# Patient Record
Sex: MALE | Race: WHITE
[De-identification: names, ages, dates, MRNs, and addresses within clinical notes are randomized per-mention and may not be internally consistent; named-entity substitution may affect disease eponyms.]

---

## 2017-12-04 ENCOUNTER — HOSPITAL ENCOUNTER (OUTPATIENT)
Dept: HOSPITAL 84 - D.ECHO | Age: 72
Discharge: HOME | End: 2017-12-04
Attending: INTERNAL MEDICINE
Payer: MEDICARE

## 2017-12-04 VITALS — BODY MASS INDEX: 23.4 KG/M2

## 2017-12-04 DIAGNOSIS — I48.91: Primary | ICD-10-CM

## 2017-12-12 NOTE — EC
PATIENT:JOSE MARIA HUSTON                DATE OF SERVICE: 12/04/17
SEX: M                                  MEDICAL RECORD: [id number]
DATE OF BIRTH: [birth year]                        [facility]          
AGE OF PATIENT: 72                             ADMISSION DATE: 12/04/17
 
REFERRING PHYSICIAN:                               
 
INTERPRETING PHYSICIAN: RACHEL BENAVIDES MD             
 
 
 
                             ECHOCARDIOGRAM REPORT
  ECHO CHARGES 4               ECHO COMPLETE            
 
 
 
CLINICAL DIAGNOSIS: A-FIB/CMP                     
 
                         ECHOCARDIOGRAPHIC MEASUREMENTS
      (adult normal given)
   AC root (d.<3.7cm) 3.8  cm   LV Septum d (<1.2 cm> 1.2  cm
      Valve Excursion 2.1  cm     LV Septum (systole) 1.9  cm
Left Atria (s.<4.0cm> 2.9  cm          LVPW d(<1.2cm) 1.3  cm
        RV (d.<2.3cm) 2.2  cm           LVPW (sytole) 1.9  cm
  LV diastole(<5.6CM) 5.2  cm       MV E-F(>70mm/sec)      cm
           LV systole 2.8  cm           LVOT Diameter 2.0  cm
       MV exc.(>10mm)      cm
Est.ejection fraction (50-75%)     %   Pericardial Effusion N
 
   DOPPLER:
     LVIT      cm/sec A 55.0 cm/sec E 41.0  cm/sec
       LA      cm/sec      RVSP 45.0 mmHg
     LVOT 101  cm/sec   AOP1/2T      m/s
  Asc. Ao 130  cm/sec
     RVOT 57.0 cm/sec
       RA      cm/sec
         cm/sec
 AV Gradient Peak 6.7  mmHg  AV Mean 2.8  mmHg  AV Area 2.4  cm
 MV Gradient Peak 2.5  mmHg  MV Mean 0.76 mmHg  MV Area      cm
   COMMENTS:                                              
 
 
 Cardiac Sonographer: Nicole STEPHENSOE            
      Cardiologist: Med Chirinos               
             TAPE# PACS           
                                                                                     
 
 
DATE OF SERVICE:  12/04/2017
 
Echocardiogram
 
FINDINGS:
1.  Left ventricular chamber size is within normal limits.  Left ventricular
systolic function is normal.  Overall ejection fraction estimated at 55%.
2.  Left atrium is within normal limits at 2.9 cm.  Right atrium and right
ventricular chamber sizes are mildly dilated.
3.  Valvular structures have normal structure and motion.
 
 
 
ECHOCARDIOGRAM REPORT                          [id number]    JOSE MARIA HUSTON        
 
 
4.  Doppler interrogation reveals mild to moderate aortic insufficiency,
moderate mitral regurgitation, mild to moderate tricuspid regurgitation, no
other valvular insufficiency or stenosis.  Pulmonary systolic pressure is mildly
elevated estimated at 45 mmHg.
5.  No evidence of pericardial effusion or left ventricular thrombus.
 
TRANSINT:[id number] Voice Confirmation ID: [id number] DOCUMENT ID: [id number]
                                           
                                           RACHEL BENAVIDES MD             
 
 
 
Electronically Signed by RACHEL BENAVIDES on 12/12/17 at 1215
 
 
 
 
 
 
 
 
 
 
 
 
 
 
 
 
 
 
 
 
 
 
 
 
 
 
 
 
 
 
 
 
 
CC:                                                             [id number]
DICTATION DATE: 12/07/17 1257     :     12/07/17 1306      DEP CLI 
                                                                      12/04/17
[address] [address]

## 2017-12-27 ENCOUNTER — HOSPITAL ENCOUNTER (OUTPATIENT)
Dept: HOSPITAL 84 - D.CATH | Age: 72
Discharge: HOME | End: 2017-12-27
Attending: INTERNAL MEDICINE
Payer: MEDICARE

## 2017-12-27 VITALS — SYSTOLIC BLOOD PRESSURE: 156 MMHG | DIASTOLIC BLOOD PRESSURE: 96 MMHG

## 2017-12-27 VITALS
BODY MASS INDEX: 23.35 KG/M2 | WEIGHT: 145.3 LBS | HEIGHT: 66 IN | BODY MASS INDEX: 23.35 KG/M2 | WEIGHT: 145.3 LBS | HEIGHT: 66 IN

## 2017-12-27 DIAGNOSIS — I48.91: ICD-10-CM

## 2017-12-27 DIAGNOSIS — R07.9: ICD-10-CM

## 2017-12-27 DIAGNOSIS — I10: ICD-10-CM

## 2017-12-27 DIAGNOSIS — I08.0: ICD-10-CM

## 2017-12-27 DIAGNOSIS — Z01.812: ICD-10-CM

## 2017-12-27 DIAGNOSIS — R94.30: Primary | ICD-10-CM

## 2017-12-27 LAB
ANION GAP SERPL CALC-SCNC: 8.2 MMOL/L (ref 8–16)
BASOPHILS NFR BLD AUTO: 1.2 % (ref 0–2)
BUN SERPL-MCNC: 15 MG/DL (ref 7–18)
CALCIUM SERPL-MCNC: 8.5 MG/DL (ref 8.5–10.1)
CHLORIDE SERPL-SCNC: 107 MMOL/L (ref 98–107)
CO2 SERPL-SCNC: 29 MMOL/L (ref 21–32)
CREAT SERPL-MCNC: 1 MG/DL (ref 0.6–1.3)
EOSINOPHIL NFR BLD: 2.9 % (ref 0–7)
ERYTHROCYTE [DISTWIDTH] IN BLOOD BY AUTOMATED COUNT: 13.6 % (ref 11.5–14.5)
GLUCOSE SERPL-MCNC: 90 MG/DL (ref 74–106)
HCT VFR BLD CALC: 40.2 % (ref 42–54)
HGB BLD-MCNC: 13 G/DL (ref 13.5–17.5)
IMM GRANULOCYTES NFR BLD: 0 % (ref 0–5)
LYMPHOCYTES NFR BLD AUTO: 34.2 % (ref 15–50)
MCH RBC QN AUTO: 29.7 PG (ref 26–34)
MCHC RBC AUTO-ENTMCNC: 32.3 G/DL (ref 31–37)
MCV RBC: 92 FL (ref 80–100)
MONOCYTES NFR BLD: 9.1 % (ref 2–11)
NEUTROPHILS NFR BLD AUTO: 52.6 % (ref 40–80)
OSMOLALITY SERPL CALC.SUM OF ELEC: 279 MOSM/KG (ref 275–300)
PLATELET # BLD: 175 10X3/UL (ref 130–400)
PMV BLD AUTO: 9.9 FL (ref 7.4–10.4)
POTASSIUM SERPL-SCNC: 4.2 MMOL/L (ref 3.5–5.1)
RBC # BLD AUTO: 4.37 10X6/UL (ref 4.2–6.1)
SODIUM SERPL-SCNC: 140 MMOL/L (ref 136–145)
WBC # BLD AUTO: 3.4 10X3/UL (ref 4.8–10.8)

## 2017-12-27 NOTE — NUR
1115 DC INSTRUCTIONS REVIEWED WITH PT AND FAMILY WHO VERBALIZE
UNDERSTANDING. DRESSING TO RIGHT WRIST IS CDI, WRIST IMMOBILIZER IN
PLACE. PT ESCORTED TO PRIVATE AUTO VIA WC BY NURSE WITH FAMILY MEMBER
DRIVING HIM HOME. PT DENIES ANY C/O UPON DC TO HOME.

## 2017-12-27 NOTE — NUR
1100 REMAINING AIR REMOVED FROM TR BAND WITH NO BLEEDING OR HEMATOMA
NOTED. 2X2 AND TEGADERM PLACED TO SITE. IV DC'D WITH CATH INTACT. PT
DRESSING FOR DC TO HOME.

## 2017-12-27 NOTE — NUR
ROOM AIR, NO RESP DISTRESS. RIGHT WRIST TR BAND CDI, NO BLEEDING OR
HEMATOMA NOTED. NO C/O NAUSEA OR PAIN. VSS. CALL LIGHT WITHIN REACH.

## 2017-12-27 NOTE — NUR
1015 ATTEMPTED TO REMOVE AIR FROM TR BAND WITH IMMEDIDATE OOZING.
LESS THAN 1 CC OF AIR REMOVED. AIR RE-INSTILLED AND OOZING STOPPED,
WILL CONTINUE TO MONITOR. PT DENIES ANY C/O.

## 2017-12-27 NOTE — NUR
1000 PT ALERT, DENIES ANY C/O. RR EVEN AND UNLABORED ON ROOM AIR.
SANDWICH AND PO FLUIDS SERVED. NO BLEEDING OR HEMATOMA NOTED AT TR
BAND SITE. FINGERS WARM, CAP REFILL IS BRISK.

## 2017-12-27 NOTE — NUR
1040 3 CC OF AIR REMOVED FROM TR BAND WITH NO BLEEDING OR HEMATOMA
NOTED. PT DENIES ANY C/O AT THIS TIME.

## 2017-12-27 NOTE — HEMODYNAMI
PATIENT:JOSE MARIA HUSTON                                 MEDICAL RECORD: [id number]
: 43                                            LOCATION:D.CAT          
ACCT# [id number]                                       ADMISSION DATE: 17
 
 
 Generatedon:20178:47
Patient name: JOSE MARIA HUSTON Patient #: [id number] Visit #: [id number] SSN: YOB: 1945 Date
of study: 2017
Page: Of
Hemodynamic Procedure Report
****************************
Patient Data
Patient Demographics
Procedure consent was obtained
First Name: JOSE MARIA         Gender: Male
Last Name: ROBEL           : 1945
Middle Initial: G           Age: 72 year(s)
Patient #: [id number]       Race: Unknown
Visit #: [id number]
Accession #:
[id number]
Additional ID: [id number]
Contact details
Address: [address] [place] [address]     Phone: [phone]
State: [place]
City: [place]
Zip code: [address]
Past Medical History
Allergies: No known allergies
Admission
Admission Data
Admission Date: 2017  Admission Time: 6:24
Procedure
Procedure Types
Cath Procedure
Diagnostic Procedure
LHC
LHC w/Coronaries
Miscellaneous Procedures
Moderate Sedation up to 30 minutes
Procedure Description
Procedure Date
Procedure Date: 2017
Procedure Start Time: 8:31
Procedure End Time: 8:44
Procedure Staff
Name                            Function
Faustino Chirinos MD                  Performing Physician
Latasha Tineo RT             Monitor
Nancy Mock RT               Scrub
Dave Watters RN                  Nurse
Procedure Data
Cath Procedure
Fluoroscopy
Diagnostic fluoroscopy      Total fluoroscopy Time: 4
time: 4 min                 min
Diagnostic fluoroscopy      Total fluoroscopy dose: 253
dose: 253 mGy               mGy
 
Contrast Material
Contrast Material Type                       Amount (ml)
Isovue 300                                   45
Entry Location
Entry     Primary  Successful  Side  Size  Upsize Upsize Entry    Closure     Santos
ccessful  Closure
Location                             (Fr)  1 (Fr) 2 (Fr) Remarks  Device        
          Remarks
Radial                         Right 6 Fr                         Mechanical
artery                               Short                        Compression
Estimated blood loss: 5 ml
Diagnostic catheters
Device Type               Used For           End Catheter
Placement
DIAGNOSTIC [place] 110cm 5  Left Coronary
Fr catheter ([id number])      Angiography
DIAGNOSTIC Tiger 110cm 5  Right Coronary
Fr catheter (781548)      Angiography
DIAGNOSTIC Tiger 110cm 5  LV Angiography
Fr catheter (444803)
Procedure Complications
No complications
Procedure Medications
Medication           Administration Route Dosage
Benadryl             I.V.                 25 mg
Oxygen               NC                   2 l/min
Lidocaine 2%         added to field       20
Heparin Flush Bag    added to field       2 bags
(1000units/500ml NS)
0.9% NaCl            I.V.                 100 ml/hr
Versed               I.V.                 1 mg
Fentanyl             I.V.                 50 mcg
Radial Cocktail      I.A.                 1 syringe
(Verapomil 2mg/Nitro
400mcg/Heparin
1500units)
Hemodynamics
Rest
Heart Rate: 0 (bpm)
Pressure Samples
Time     Site     Value (mmHg) Purpose      Heart      Use
Rate(bpm)
8:40     LV       136/0,11     EDP          55
8:41     AO       141/80(104)  Pullback     56
8:41     LV       124/7,21     Pullback     56
Gradients
Valve  Time  Site 1   Site 2      Mean    SEP/DFP    Peak To Heart  Use
(mmHg)  (sec/min)  Peak    Rate
(mmHg)  (bpm)
Aortic 8:41  LV       AO          0       2          0       56
124/7,21 141/80(104)
Calculations
Valve    P-P      Mean      Valve     Index  Valve    Source
Name              Gradient  Area             Flow
(cm2)
Aortic   0        0
0        0
Snapshots
Pre Cath      Intra         NCS           Post Cath
Vital Signs
 
Time    Heart  Resp   SPO2 etCO2   NIBP (mmHg)  Rhythm  Pain    Sedation
Rate   (ipm)  (%)  (mmHg)                       Status  Level
(bpm)
8:18:43 55     14     98   0       172/106(152) NSR     0 (11)  10(A)
, No
pain
8:23:32 60     19     100  0       181/119(164) NSR     0 (11)  10(A)
, No
pain
8:28:23 60     15     100  29      175/116(158) NSR     0 (11)  10(A)
, No
pain
8:33:03 55     17     99   22.9    147/97(117)  NSR     0 (11)  9(A)
, No
pain
8:37:42 57     16     97   20.6    136/83(106)  NSR     0 (11)  9(A)
, No
pain
8:42:22 54     15     99   30.5    142/88(119)  NSR     0 (11)  9(A)
, No
pain
8:45:31 54     15     100  16      131/87(104)  NSR     0 (11)  10(A)
, No
pain
Medications
Time    Medication       Route  Dose    Verified Delivered Reason       Notes  E
ffectiveness
by       by
8:21:15 Benadryl         I.V.   25 mg   Faustino     Buffie    used for
Candis Watters RN   procedure
MD
8:21:35 Oxygen           NC     2 l/min Faustino     Buffie    used for
Candis bernal MD
8:21:43 Lidocaine 2%     added  20ml    Faustino     Faustino      for local
to     vial    Candis Chirinos MD anesthetic
field          MD
8:21:50 Heparin Flush    added  2 bags  Faustino     Faustino      used for
Bag              to             Candis Chirinos MD procedure
(1000units/500ml field          MD
NS)
8:21:59 0.9% NaCl        I.V.   100     Faustino     Buffie    Per
ml/hr   Candis Watters RN   physician
MD
8:29:13 Versed           I.V.   1 mg    Faustino     Buffie    for sedation
Candis Watters RN, MD
8:29:19 Fentanyl         I.V.   50 mcg  Faustino     Buffie    for sedation
Candis Watters RN, MD
8:32:59 Radial Cocktail  I.A.   1       Faustino     Faustino      for
(Verapomil              syringe Candis Chirinos MD vasodilation
2mg/Nitro                       MD
400mcg/Heparin
1500units)
Procedure Log
Time     Note
8:01:37  Latasha Counts RT(R) sent for patient. Start room
use.
8:11:42  Time tracking: Regular hours
 
8:11:47  Plan of Care:Hemodynamics will remain stable., Cardiac
rhythm will remain stable., Comfort level will be
maintained., Respiratory function will remain
adequate., Patient/ family verbilizes understanding of
procedure., Procedure tolerated without complication.,
Recovers from procedure without complications..
8:12:00  Patient received from Pre/Post Procedure Room to [facility] 1
Alert and oriented. Tansferred to table in Supine
position.
8:12:01  Warm blankets applied, and jo hugger turned on for
patient comfort.
8:12:02  Correct patient and procedure confirmed by team.
8:12:03  Signed procedure consent form obtained from patient.
8:12:04  ECG and BP/O2 sat monitors applied to patient.
8:17:32  Baseline sample Acquired.
8:17:38  Rhythm: atrial fibrillation
8:17:43  Vital chart was started
8:17:45  Full Disclosure recording started
8:18:00  H&P Date Dictated: 2017 Within 30 days and on
chart., H&P Addendum completed by physician on day of
procedure. (MUST COMPLETE FOR ALL OUTPATIENTS).
8:18:02  Pre-procedure instructions explained to patient.
8:18:02  Pre-op teaching completed and patient verbalized
understanding.
8:18:05  Family in patients room.
8:18:07  Patient NPO since Midnight.
8:18:13  Patient allergic to No known allergies
8:18:16  Is the patient allergic to Iodine/contrast media? No.
8:18:26  Patient diabetic? No.
8:18:29  Previous problem with sedation/anesthesia? No ?
8:18:30  Snore? No
8:18:31  Sleep apnea? No
8:18:32  Deviated septum? No
8:18:33  Opens mouth fully? Yes
8:18:33  Sticks out tongue? Yes
8:18:36  Airway obstruction? No ?
8:18:38  Dentures? Yes In
8:18:42  Pre procedure: right dorsailis pedis pulse 2+ Normal;
easily identifiable; not easily obliterated
8:18:48  Modified Ck's test Radial < 7 seconds
8:18:50  Patient pain scale 0/10 ?.
8:18:57  IV patent on arrival in left hand with 0.9% NaCl at
O.
8:19:01  Lab results completed and on chart.
8:19:09  Right Radial & Right Groin area was prepped with
chlora-prep and draped in sterile fashion
8:19:10  Alarms reviewed by R. N.
8:19:10  Sharps counted by scrub and verified by R.N.
8:19:54  Baseline sample Acquired.
8:19:59  Use device set Radial Dx
8:19:59  ACIST Syringe (76428) opened to sterile field.
8:20:00  Medline Cath Pack ([id number]) opened to sterile field.
8:20:00  Bag Decanter (2002S) opened to sterile field.
8:20:01  SHEATH 6FR Slender ([id number]) opened to sterile
field.
8:20:01  DIAGNOSTIC WIRE .035 260cm J wire (544885) opened to
sterile field.
8:20:03  ACIST Hand Control (14244) opened to sterile field.
8:20:03  ACIST Manifold (37644) opened to sterile field.
8:20:04  Tegaderm 4 x 4 (1626W) opened to sterile field.
 
8:20:04  MBrace Wrist Support ([id number]) opened to sterile
field.
8:21:15  Benadryl 25 mg I.V. was administered by Dave Watters
RN; used for procedure;
8:21:35  Oxygen 2 l/min NC was administered by Dave Watters RN;
used for procedure;
8:21:43  Lidocaine 2% 20ml vial added to field was administered
by Faustino Chirinos MD; for local anesthetic;
8:21:50  Heparin Flush Bag (1000units/500ml NS) 2 bags added to
field was administered by Faustino Chirinos MD; used for
procedure;
8:21:59  0.9% NaCl 100 ml/hr I.V. was administered by Dave Watters RN; Per physician;
8:23:03  Final Timeout: patient, procedure, and site verified
with staff and physician. All members of the team are
in agreement.
8:23:05  Right Radial & Right Groin site verified by team.
8:23:08  Physical assessment completed. ASA score P 2 - A
patient with mild systemic disease as per Faustino Chirinos MD.
8:23:12  Sedation plan: IV Moderate Sedation Medication:Versed,
Fentanyl
8:27:25  Zero performed for pressure channel P1
8:29:13  Versed 1 mg I.V. was administered by Dave Watters RN;
for sedation;
8:29:19  Fentanyl 50 mcg I.V. was administered by Dave Watters RN; for sedation;
8:31:04  Procedure started.
8:31:14  Local anesthetic to right radial artery with Lidocaine
2% by Faustino Chirinos MD.**INITIAL ACCESS ONLY**
8:31:37  A 6 Fr Short sheath was inserted into the Right Radial
artery
8:32:59  Radial Cocktail (Verapomil 2mg/Nitro 400mcg/Heparin
1500units) 1 syringe I.A. was administered by Faustino Chirinos MD; for vasodilation;
8:33:20  A DIAGNOSTIC Tiger 110cm 5 Fr catheter (531355) was
advanced over the wire and used for Left Coronary
Angiography.
8:38:39  A DIAGNOSTIC Tiger 110cm 5 Fr catheter (140156) was
advanced over the wire and used for Right Coronary
Angiography.
8:39:08  Terumo ANGLE 260cm glide wire opened to sterile field.
8:39:38  Exch glide wire advanced.
8:41:03  A DIAGNOSTIC Tiger 110cm 5 Fr catheter (820908) was
advanced over the wire and used for LV Angiography.
8:41:31  LV gram done using ZELAYA
8:41:32  LV hemodynamics recorded.
8:41:35  Injector settings: Ml/sec: 12, Volume: 8,
8:41:36  Catheter removed.
8:41:44  Sheath removed intact; hemostasis achieved with
Mechanical Compression to the Right Radial artery.
8:41:49  Procedure ended.(Physican Out)
8:42:18  Fluoroscopy time 04.00 minutes.
8:42:23  Flurop Dose total: 253
8:42:23  Fluoroscopy dose: 253 mGy
8:42:27  Contrast amount:Isovue 300 45ml.
8:42:29  Sharps counted by scrub and verified by R.N.
8:42:31  TR band inflated with 10cc of air.
8:42:32  Insertion/operative site no bleeding no hematoma.
8:42:50  Post right radial artery:stable, soft, clean and dry
 
8:42:52  Post Procedure Pulses reassessed and unchanged
8:42:55  Post-procedure physical assessment completed. ASA
score P 2 - A patient with mild systemic disease as
per Faustino Chirinos MD.
8:42:57  Post procedure rhythm: unchanged.
8:43:00  Estimated blood loss: 5 ml
8:43:08  Post procedure instruction explained to
patient.Patient verbalizes understanding.
8:43:09  Patient needs reinforcement of post procedure
teaching.
8:43:26  Procedure type changed to Cath procedure, Diagnostic
procedure, LHC, LHC w/Coronaries, Miscellaneous
Procedures, Moderate Sedation up to 30 minutes
8:43:31  Procedure Complication : No complications
8:43:34  See physician's report for complete and final results.
8:43:45  TR BAND Standard ([id number]) opened to sterile field.
8:44:31  Procedure and supply charges have been captured,
reviewed, submitted and are correct.
8:44:32  Vital chart was stopped
8:44:34  Report given to Pre/Post Procedure Room.
8:44:37  Patient transfered to Pre/Post Procedure Room with
Stretcher.
8:44:46  Procedure ended.
8:44:46  Full Disclosure recording stopped
8:44:49  End room use (Document Last)
Device Usage
Item Name    Manufacture  Quantity  Catalog      Hospital Part    Current Minima
l Lot# /
Number       Charge   Number  Stock   Stock   Serial#
Code
ACIST        Acist        1         12370        372713   099900  [id number]  20
Syringe      Medical
(19551)      Systems Inc
Medline Cath Cardinal     1         [id number]    060401   95165   276871  5
Pack         Health
([id number])
Bag Decanter Microtek     1         S        900329   81097   300301  5
(S)      Medical Inc.
SHEATH 6FR   Terumo       1         [id number]   433266   988412  886381  40
Slender
([id number])
DIAGNOSTIC   St Rudy      1         181861       803002   262338  491651  30
WIRE .035
260cm J wire
(755400)
ACIST Hand   Acist        1         07090        033891   686779  881779  5
Control      Medical
(25029)      Systems Inc
ACIST        Acist        1         69574        128365   612605  085648  5
Manifold     Medical
(34240)      Systems Inc
Tegaderm 4 x 3M           1         1626W        307977   892064  900166  5
4 (1626W)
MBrace Wrist Advanced     1         [id number]  728261   83730   860431  5
Support      Vascular
([id number])  Dynamics
DIAGNOSTIC   Terumo       1               833334   195268  800048  5
Tiger 110cm
5 Fr
catheter
 
(793109)
Terumo ANGLE Terumo       1         HM6117       478921   236531  059520  5
260cm glide
wire
TR BAND      Terumo       1         [id number]    751528   352864  729518  40
Standard
(YCG80ZML)
Signature Audit Lyons
Stage           Time        Signature      Unsigned
Intra-Procedure 2017  Latasha
8:47:06 AM  Counts RT(R)
Signatures
Monitor : Latasha     Signature :
Counts RT               ______________________________
Date : ______________ Time :
______________
 
 
 
 
 
 
 
 
 
 
 
 
 
 
 
 
 
 
 
 
 
 
 
 
 
 
 
 
 
 
 
 
 
 
 
 
 
 
 
[facility]                                          
[address]                           
[place] [address]

## 2019-12-11 ENCOUNTER — HOSPITAL ENCOUNTER (INPATIENT)
Dept: HOSPITAL 84 - D.ER | Age: 74
LOS: 2 days | Discharge: HOME | DRG: 300 | End: 2019-12-13
Attending: FAMILY MEDICINE | Admitting: FAMILY MEDICINE
Payer: MEDICARE

## 2019-12-11 VITALS — SYSTOLIC BLOOD PRESSURE: 112 MMHG | DIASTOLIC BLOOD PRESSURE: 68 MMHG

## 2019-12-11 VITALS — SYSTOLIC BLOOD PRESSURE: 134 MMHG | DIASTOLIC BLOOD PRESSURE: 78 MMHG

## 2019-12-11 VITALS — DIASTOLIC BLOOD PRESSURE: 71 MMHG | SYSTOLIC BLOOD PRESSURE: 110 MMHG

## 2019-12-11 VITALS — SYSTOLIC BLOOD PRESSURE: 138 MMHG | DIASTOLIC BLOOD PRESSURE: 89 MMHG

## 2019-12-11 VITALS
BODY MASS INDEX: 23.43 KG/M2 | BODY MASS INDEX: 23.43 KG/M2 | HEIGHT: 66 IN | WEIGHT: 145.8 LBS | HEIGHT: 66 IN | BODY MASS INDEX: 23.43 KG/M2 | WEIGHT: 145.8 LBS

## 2019-12-11 VITALS — DIASTOLIC BLOOD PRESSURE: 79 MMHG | SYSTOLIC BLOOD PRESSURE: 115 MMHG

## 2019-12-11 VITALS — DIASTOLIC BLOOD PRESSURE: 80 MMHG | SYSTOLIC BLOOD PRESSURE: 130 MMHG

## 2019-12-11 VITALS — SYSTOLIC BLOOD PRESSURE: 149 MMHG | DIASTOLIC BLOOD PRESSURE: 91 MMHG

## 2019-12-11 VITALS — SYSTOLIC BLOOD PRESSURE: 137 MMHG | DIASTOLIC BLOOD PRESSURE: 87 MMHG

## 2019-12-11 VITALS — SYSTOLIC BLOOD PRESSURE: 128 MMHG | DIASTOLIC BLOOD PRESSURE: 89 MMHG

## 2019-12-11 VITALS — DIASTOLIC BLOOD PRESSURE: 92 MMHG | SYSTOLIC BLOOD PRESSURE: 121 MMHG

## 2019-12-11 VITALS — DIASTOLIC BLOOD PRESSURE: 99 MMHG | SYSTOLIC BLOOD PRESSURE: 132 MMHG

## 2019-12-11 VITALS — SYSTOLIC BLOOD PRESSURE: 128 MMHG | DIASTOLIC BLOOD PRESSURE: 75 MMHG

## 2019-12-11 VITALS — SYSTOLIC BLOOD PRESSURE: 149 MMHG | DIASTOLIC BLOOD PRESSURE: 88 MMHG

## 2019-12-11 DIAGNOSIS — D64.9: ICD-10-CM

## 2019-12-11 DIAGNOSIS — I82.C12: Primary | ICD-10-CM

## 2019-12-11 DIAGNOSIS — I10: ICD-10-CM

## 2019-12-11 DIAGNOSIS — I48.20: ICD-10-CM

## 2019-12-11 DIAGNOSIS — Z79.01: ICD-10-CM

## 2019-12-11 DIAGNOSIS — E78.5: ICD-10-CM

## 2019-12-11 DIAGNOSIS — Z86.73: ICD-10-CM

## 2019-12-11 DIAGNOSIS — R48.2: ICD-10-CM

## 2019-12-11 DIAGNOSIS — R07.9: ICD-10-CM

## 2019-12-11 LAB
ALBUMIN SERPL-MCNC: 3.8 G/DL (ref 3.4–5)
ALP SERPL-CCNC: 67 U/L (ref 46–116)
ALT SERPL-CCNC: 26 U/L (ref 10–68)
ANION GAP SERPL CALC-SCNC: 14.2 MMOL/L (ref 8–16)
APTT BLD: 32.5 SECONDS (ref 22.8–39.4)
BASOPHILS NFR BLD AUTO: 0.9 % (ref 0–2)
BILIRUB SERPL-MCNC: 0.23 MG/DL (ref 0.2–1.3)
BUN SERPL-MCNC: 13 MG/DL (ref 7–18)
CALCIUM SERPL-MCNC: 8.9 MG/DL (ref 8.5–10.1)
CHLORIDE SERPL-SCNC: 105 MMOL/L (ref 98–107)
CK MB SERPL-MCNC: 1.5 U/L (ref 0–3.6)
CK MB SERPL-MCNC: 2.4 U/L (ref 0–3.6)
CK SERPL-CCNC: 134 UL (ref 21–232)
CK SERPL-CCNC: 98 UL (ref 21–232)
CO2 SERPL-SCNC: 25 MMOL/L (ref 21–32)
CREAT SERPL-MCNC: 1 MG/DL (ref 0.6–1.3)
EOSINOPHIL NFR BLD: 1.1 % (ref 0–7)
ERYTHROCYTE [DISTWIDTH] IN BLOOD BY AUTOMATED COUNT: 13 % (ref 11.5–14.5)
GLOBULIN SER-MCNC: 4.4 G/L
GLUCOSE SERPL-MCNC: 92 MG/DL (ref 74–106)
HCT VFR BLD CALC: 41.3 % (ref 42–54)
HGB BLD-MCNC: 13.4 G/DL (ref 13.5–17.5)
IMM GRANULOCYTES NFR BLD: 0.2 % (ref 0–5)
INR PPP: 1.67 (ref 0.85–1.17)
LYMPHOCYTES NFR BLD AUTO: 18.4 % (ref 15–50)
MAGNESIUM SERPL-MCNC: 2.3 MG/DL (ref 1.8–2.4)
MCH RBC QN AUTO: 30.1 PG (ref 26–34)
MCHC RBC AUTO-ENTMCNC: 32.4 G/DL (ref 31–37)
MCV RBC: 92.8 FL (ref 80–100)
MONOCYTES NFR BLD: 7.2 % (ref 2–11)
NEUTROPHILS NFR BLD AUTO: 72.2 % (ref 40–80)
OSMOLALITY SERPL CALC.SUM OF ELEC: 278 MOSM/KG (ref 275–300)
PLATELET # BLD: 274 10X3/UL (ref 130–400)
PMV BLD AUTO: 9.8 FL (ref 7.4–10.4)
POTASSIUM SERPL-SCNC: 4.2 MMOL/L (ref 3.5–5.1)
PROT SERPL-MCNC: 8.2 G/DL (ref 6.4–8.2)
PROTHROMBIN TIME: 19.1 SECONDS (ref 11.6–15)
RBC # BLD AUTO: 4.45 10X6/UL (ref 4.2–6.1)
SODIUM SERPL-SCNC: 140 MMOL/L (ref 136–145)
TROPONIN I SERPL-MCNC: < 0.017 NG/ML (ref 0–0.06)
TROPONIN I SERPL-MCNC: < 0.017 NG/ML (ref 0–0.06)
WBC # BLD AUTO: 4.6 10X3/UL (ref 4.8–10.8)

## 2019-12-11 NOTE — NUR
PT AMBULATED WITH RN TO BATHROOM, CLEAR YELLOW VOID NOTED, PT AMBULATED BACK
TO BED WITHOUT ASSIST AND REPOSITIONED FOR COMFORT, CALL LIGHT IN REACH, BED
ALARM ON, VSS, WILL CONTINUE TO MONITOR

## 2019-12-11 NOTE — NUR
REASSESSMENT COMPLETE PER FLOW SHEET, NO ACUTE CHANGES FROM INITIAL ASSESSMENT
NOTED, PT AAOx4, DENIES PAIN OR NEEDS AT THIS TIME, VSS, SINUS SANDOR @ 57bpm
ON CM, OTHER VSS, PT STATED HEART RATE IS NORMAL RATE AFTER HE WAS STARTED ON
HEART MEDICATION, WILL CONTINUE TO MONITOR, CALL LIGHT IN REACH, BED ALARM ON

## 2019-12-11 NOTE — NUR
PT ARRIVED TO CVICU VIA ER BED WITH x2 ER RN'S, PT AMBULATED TO CVICU BED,
ICU MONITORS CONNECTED, PT AAOx4 DENIES PAIN OR NEEDS AT THIS TIME, YELLOW
GOWN PLACED ON PT, BLE SARITA HOSE AND SCD'S, LEFT FA 20g PIV INFISING NS @
150ML/HR, PIV SITE C/D/I PATENT, NSR @ 68bpm ON CM, OTHER VSS, PT WEARING
HEARING AIDS,
SLIGHT RIGHT HAND  SLIGHT WEAKNESS, EYES ASYMETRICAL IN POSITION, PERRLA,
ANSWER ALL QUESTIONS CORRECTLY WITH NO DIFICULTY, PPP IN ALL EXTREMITIES, ABLE
TO REPOSITION SELF IN BED, ORRIENTATED PT TO CVICU, CALL LIGHT IN REACH, BED
ALARM ON, INITIAL ASSESSMENT COMPLETE, NO ACUTE S/S OF DISTRESS NOTED, WILL
CONTINUE TO MONITOR

## 2019-12-12 VITALS — DIASTOLIC BLOOD PRESSURE: 79 MMHG | SYSTOLIC BLOOD PRESSURE: 114 MMHG

## 2019-12-12 VITALS — SYSTOLIC BLOOD PRESSURE: 114 MMHG | DIASTOLIC BLOOD PRESSURE: 75 MMHG

## 2019-12-12 VITALS — DIASTOLIC BLOOD PRESSURE: 84 MMHG | SYSTOLIC BLOOD PRESSURE: 131 MMHG

## 2019-12-12 VITALS — DIASTOLIC BLOOD PRESSURE: 72 MMHG | SYSTOLIC BLOOD PRESSURE: 126 MMHG

## 2019-12-12 VITALS — DIASTOLIC BLOOD PRESSURE: 71 MMHG | SYSTOLIC BLOOD PRESSURE: 107 MMHG

## 2019-12-12 VITALS — SYSTOLIC BLOOD PRESSURE: 101 MMHG | DIASTOLIC BLOOD PRESSURE: 63 MMHG

## 2019-12-12 VITALS — DIASTOLIC BLOOD PRESSURE: 76 MMHG | SYSTOLIC BLOOD PRESSURE: 119 MMHG

## 2019-12-12 VITALS — DIASTOLIC BLOOD PRESSURE: 81 MMHG | SYSTOLIC BLOOD PRESSURE: 124 MMHG

## 2019-12-12 VITALS — DIASTOLIC BLOOD PRESSURE: 64 MMHG | SYSTOLIC BLOOD PRESSURE: 100 MMHG

## 2019-12-12 VITALS — SYSTOLIC BLOOD PRESSURE: 126 MMHG | DIASTOLIC BLOOD PRESSURE: 84 MMHG

## 2019-12-12 VITALS — SYSTOLIC BLOOD PRESSURE: 128 MMHG | DIASTOLIC BLOOD PRESSURE: 81 MMHG

## 2019-12-12 VITALS — DIASTOLIC BLOOD PRESSURE: 73 MMHG | SYSTOLIC BLOOD PRESSURE: 108 MMHG

## 2019-12-12 VITALS — SYSTOLIC BLOOD PRESSURE: 111 MMHG | DIASTOLIC BLOOD PRESSURE: 67 MMHG

## 2019-12-12 VITALS — DIASTOLIC BLOOD PRESSURE: 73 MMHG | SYSTOLIC BLOOD PRESSURE: 112 MMHG

## 2019-12-12 VITALS — SYSTOLIC BLOOD PRESSURE: 135 MMHG | DIASTOLIC BLOOD PRESSURE: 73 MMHG

## 2019-12-12 VITALS — SYSTOLIC BLOOD PRESSURE: 106 MMHG | DIASTOLIC BLOOD PRESSURE: 70 MMHG

## 2019-12-12 VITALS — SYSTOLIC BLOOD PRESSURE: 114 MMHG | DIASTOLIC BLOOD PRESSURE: 73 MMHG

## 2019-12-12 VITALS — DIASTOLIC BLOOD PRESSURE: 69 MMHG | SYSTOLIC BLOOD PRESSURE: 109 MMHG

## 2019-12-12 VITALS — SYSTOLIC BLOOD PRESSURE: 102 MMHG | DIASTOLIC BLOOD PRESSURE: 75 MMHG

## 2019-12-12 VITALS — SYSTOLIC BLOOD PRESSURE: 130 MMHG | DIASTOLIC BLOOD PRESSURE: 80 MMHG

## 2019-12-12 VITALS — DIASTOLIC BLOOD PRESSURE: 65 MMHG | SYSTOLIC BLOOD PRESSURE: 112 MMHG

## 2019-12-12 VITALS — SYSTOLIC BLOOD PRESSURE: 128 MMHG | DIASTOLIC BLOOD PRESSURE: 79 MMHG

## 2019-12-12 VITALS — SYSTOLIC BLOOD PRESSURE: 129 MMHG | DIASTOLIC BLOOD PRESSURE: 809 MMHG

## 2019-12-12 LAB
ALBUMIN SERPL-MCNC: 3.1 G/DL (ref 3.4–5)
ALP SERPL-CCNC: 49 U/L (ref 46–116)
ALT SERPL-CCNC: 22 U/L (ref 10–68)
ANION GAP SERPL CALC-SCNC: 13.1 MMOL/L (ref 8–16)
APPEARANCE UR: CLEAR
BASOPHILS NFR BLD AUTO: 1.5 % (ref 0–2)
BILIRUB SERPL-MCNC: 0.29 MG/DL (ref 0.2–1.3)
BILIRUB SERPL-MCNC: NEGATIVE MG/DL
BUN SERPL-MCNC: 17 MG/DL (ref 7–18)
CALCIUM SERPL-MCNC: 8.2 MG/DL (ref 8.5–10.1)
CHLORIDE SERPL-SCNC: 108 MMOL/L (ref 98–107)
CK MB SERPL-MCNC: 1.3 U/L (ref 0–3.6)
CK MB SERPL-MCNC: 1.4 U/L (ref 0–3.6)
CK SERPL-CCNC: 84 UL (ref 21–232)
CK SERPL-CCNC: 85 UL (ref 21–232)
CO2 SERPL-SCNC: 23.9 MMOL/L (ref 21–32)
COLOR UR: YELLOW
CREAT SERPL-MCNC: 1.1 MG/DL (ref 0.6–1.3)
EOSINOPHIL NFR BLD: 1.5 % (ref 0–7)
ERYTHROCYTE [DISTWIDTH] IN BLOOD BY AUTOMATED COUNT: 13.1 % (ref 11.5–14.5)
FERRITIN SERPL-MCNC: 16 NG/ML (ref 3–244)
GLOBULIN SER-MCNC: 3.4 G/L
GLUCOSE SERPL-MCNC: 85 MG/DL (ref 74–106)
GLUCOSE SERPL-MCNC: NEGATIVE MG/DL
HCT VFR BLD CALC: 37.1 % (ref 42–54)
HGB BLD-MCNC: 11.9 G/DL (ref 13.5–17.5)
IMM GRANULOCYTES NFR BLD: 0 % (ref 0–5)
IRON SERPL-MCNC: 124 UG/DL (ref 35–150)
KETONES UR STRIP-MCNC: NEGATIVE MG/DL
LYMPHOCYTES NFR BLD AUTO: 29.2 % (ref 15–50)
MAGNESIUM SERPL-MCNC: 2.1 MG/DL (ref 1.8–2.4)
MCH RBC QN AUTO: 29.8 PG (ref 26–34)
MCHC RBC AUTO-ENTMCNC: 32.1 G/DL (ref 31–37)
MCV RBC: 93 FL (ref 80–100)
MONOCYTES NFR BLD: 10 % (ref 2–11)
NEUTROPHILS NFR BLD AUTO: 57.8 % (ref 40–80)
NITRITE UR-MCNC: NEGATIVE MG/ML
OSMOLALITY SERPL CALC.SUM OF ELEC: 281 MOSM/KG (ref 275–300)
PH UR STRIP: 7 [PH] (ref 5–6)
PHOSPHATE SERPL-MCNC: 4.3 MG/DL (ref 2.5–4.9)
PLATELET # BLD: 230 10X3/UL (ref 130–400)
PMV BLD AUTO: 10.1 FL (ref 7.4–10.4)
POTASSIUM SERPL-SCNC: 4 MMOL/L (ref 3.5–5.1)
PROT SERPL-MCNC: 6.5 G/DL (ref 6.4–8.2)
PROT UR-MCNC: NEGATIVE MG/DL
RBC # BLD AUTO: 3.99 10X6/UL (ref 4.2–6.1)
SAO2 % BLD FROM PO2: 34 % (ref 15–55)
SODIUM SERPL-SCNC: 141 MMOL/L (ref 136–145)
SP GR UR STRIP: 1.01 (ref 1–1.02)
TIBC SERPL-MCNC: 355 UG/DL (ref 260–445)
TROPONIN I SERPL-MCNC: < 0.017 NG/ML (ref 0–0.06)
TROPONIN I SERPL-MCNC: < 0.017 NG/ML (ref 0–0.06)
UIBC SERPL-MCNC: 231 UG/DL (ref 150–375)
UROBILINOGEN UR-MCNC: NORMAL MG/DL
WBC # BLD AUTO: 3.3 10X3/UL (ref 4.8–10.8)

## 2019-12-12 NOTE — NUR
REASSESSMENT COMPLETE PER FLOW SHEET, NO ACUTE CHANGES FROM PRIOR ASSESSMENT,
PT RESTING WITH EYES CLOSED, WAKES EASY WITH VERBAL STEMULI, PT AAOx4, EQUAL
 STRENGTH BILAT, MOVES ALL EXTREMITIES WITH PURPOSE, DENIES PAIN OR NEEDS,
REPOSITIONS SELF FOR COMFORT WITHOUT ASSIST, PERIODS OF SINUS SANDRO ON CM
55-60bpm, ALL OTHER VSS, CALL LIGHT IN REACH, BED ALARM ON, WILL CONTINUE TO
MONITOR

## 2019-12-12 NOTE — MORECARE
CASE MANAGEMENT DISCHARGE SUMMARY
 
 
PATIENT: JOSE MARIA HUSTON                    UNIT: [id number]
ACCOUNT#: [id number]                       ADM DATE: 19
AGE: 74     : [birth year]  SEX: M            ROOM[facility]BED: D[facility]    
AUTHOR: MILENA,DOC                             PHYSICIAN:                               
 
REFERRING PHYSICIAN: MARGARET ELISE MD           
DATE OF SERVICE: 19
Discharge Plan
 
 
Patient Name: JOSE MARIA HUSTON
Facility: [facility]:[place]
Encounter #: [id number]
Medical Record #: [id number]
: 1945
Planned Disposition: Home
Anticipated Discharge Date: 
 
Discharge Date: 
Expected LOS: 
Initial Reviewer: [id number]
Initial Review Date: 2019
Generated: 19   6:56 pm 
Comments
 
DCP- Discharge Planning
 
Updated by [id number]: Reena Garcia on 19   4:54 pm CT
Patient Name: JOSE MARIA HUSOTN                                     
Admission Status: ER   
Accout number: [id number]                              
Admission Date: 2019   
: 1945                                                        
Admission Diagnosis:   
Attending: BREANA ELISE                                                
Current LOS:  1   
  
Anticipated DC Date:    
Planned Disposition: Home   
Primary Insurance: MEDICARE A & B   
  
  
Discharge Planning Comments: CM met with patient at bedside after explaining 
CM role and obtaining verbal consent. Patient lives at home with his wife 
where he is independent with his care and plans to return there upon 
discharge.  Patient feels this would be a safe discharge.  CM discussed 
availability / needs of home health and medical equipment.  Patient denies 
 
any discharge needs at this time. Patient states he will have his family 
drive him home upon discharge.  CM will continue to follow and assist as 
needed with discharge planning / needs.  
  
  
  
  
  
  
: Reena Garcia
 DCPIA - Discharge Planning Initial Assessment
 
Updated by [id number]: Reena Garcia on 19   5:54 pm
*  Is the patient Alert and Oriented?
Yes
*  How many steps to enter\exit or inside your home? ramp *  PCP TORI GRISSOM * 
Pharmacy
[facility] [place]
*  Preadmission Environment
Home with Family
*  ADLs
Independent
*  Other Equipment
WALKER, CANE , W/C
*  List name and contact numbers for known caregivers / representatives who 
currently or will assist patient after discharge:
GEO HUSTON - DAUGHTER- [phone], [phone]
*  Verbal permission to speak to the caregivers and representatives has been 
obtained from the patient.
Yes
*  Community resources currently utilized
None
*  Additional services required to return to the preadmission environment?
No
*  Can the patient safely return to the preadmission environment?
Yes
*  Has this patient been hospitalized within the prior 30 days at any 
hospital?
No
 
 
 
 
 
 
Patient Name: JOSE MARIA HUSTON
 
Encounter #: [id number]
Page [phone]
 
 
 
 
 
Electronically Signed by YONNY ABBOTT on 19 at 1757
 
 
 
 
 
 
**All edits/amendments must be made on the electronic document**
 
DICTATION DATE: 19     : ANNABEL  19     
RPT#: [id number]                                DC DATE:        
                                               STATUS: ADM IN  
[facility]
 [place] [address]
***END OF REPORT***

## 2019-12-12 NOTE — NUR
PT OOB TO BATHROOM WITH RN AT SIDE, VOID x1 CLEAR YELLOW URINE, PT AMBULATED
BACK TO BED WITHOUT ASSIST, REPOSITIONED FOR COMFORT, HOB ELEVATED, NSR ON CM,
PT DENIES WEAKNESS,  STRENGTH STRONG BILAT UPPER EXTREMITIES, CALL LIGHT
IN REACH, BED ALARM ON, I/O'S COLLECTED, SMALL ICE WATER GIVEN PER REQUEST, NO
FURTHER NEEDS AT THIS TIME WILL CONTINUE TO MONITOR

## 2019-12-12 NOTE — NUR
UP TO BATHROOM, HIBCLENS BATH TAKEN PER PATIENT. LINEN CHANGED. PATIENT
TOLERATED WELL NO DISTRESS. SCD AND SARITA REPLACED. NO SKIN BREAKDOWN NOTED. IV
PATENT WITHOUT REDNESS OR SWELLING.

## 2019-12-12 NOTE — NUR
UP TO BATHROOM. AMBULATES WITHOUT ASSISTANCES OR DIFFICULTY.UNDERSTANDS TO
CALL NURSE DUE TO LINES. CALL LIGHT WITHIN HANDS REACH. FAMILY HERE UPDATE
GIVEN.

## 2019-12-12 NOTE — NUR
MEDS GIVEN PER MAR/ORDERS, PT AAOx4, VSS, PT HAS QUESTIONS ABOUT MEDICATION
PHYSICIAN IS TO START IN THE MORNING FOR BLOOD THINNER, WILL NOTIFY RN OR
PHYSICIAN IN MORNING

## 2019-12-12 NOTE — NUR
PT VOID CLEAR YELLOW IN URINAL, PT ACCIDENTLY SOILED LINEN AND GOWN,
BED BATH AND COMPLETE LINEN CHANGE, YELLOW GOWN PLACED ON, PT ASSISTED WITH
BATH, VSS, NSR ON CM, REPOSITIONED IN BED FOR COMFORT, CALL LIGHT IN REACH,
BED ALARM ON, NO FURTHER NEEDS AT THIS TIME, WILL CONTINUE TO MONITOR

## 2019-12-12 NOTE — NUR
AWAKE AND ALERT SKIN WARM AND DRY. SPEECH ALITTLE SLURRED. THIS IS NORMAL FOR
HIM. HAND  EQUAL AND STRONG. NO DISTRESS. IV LEFT FOREARM WITHOUT REDNESS
OR SWELLING INFUSING WITH NS AT 75 ML HOUR. VOIDING IN URINAL CLEAR YELLOW
URINE. DENIES PAIN.

## 2019-12-12 NOTE — NUR
PT OOB TO BATHROOM WITH RN AT SIDE, ABLE TO AMBULATE TO AND FROM BATHROOM
WITHOUT ASSIST, CLEAR YELLOW VOID NOTED, UA COLLECTED AND SENT TO LAB, PT
REPOSITIONED SELF IN BED FOR COMFORT, VSS, PT DENIES PAIN OR NEEDS AT THIS
TIME, CALL LIGHT IN REACH, BED ALARM ON, WILL CONTINUE TO MONITOR

## 2019-12-12 NOTE — NUR
NIDHI VELARDE NOTIFIED OF PT HEART RATE BEING 50'S-60bpm, OTHER VSS,
NO ORDERS RECEIVED, WILL CONTINUE TO MONITOR

## 2019-12-12 NOTE — CN
PATIENT NAME:JOSE MARIA BLACK                              MEDICAL RECORD: [id number]
: 43                                              LOCATION:[id number]
ADMIT DATE: 19                                       ACCOUNT: [id number]
CONSULTING PHYSICIAN:    RACHEL BENAVIDES MD             
                                               
REFERRING PHYSICIAN:     MARGARET ELISE MD           
 
 
DATE OF CONSULTATION:  2019
 
Cardiology Consultation
 
ADMITTING DIAGNOSES:
1. Chest pain.
2. Hypertension.
3. Hyperlipidemia.
4. Paroxysmal atrial fibrillation.
 
HISTORY OF PRESENT ILLNESS:  Mr. Black presents with atypical chest pain,
sharp, stabbing pain which radiates to his right arm associated with right arm
numbness as well.  He has had a workup for chest pain in the past.  He has had 3
cardiac catheterizations that have all been normal, the last one approximately a
year and a half ago.
 
PHYSICAL EXAMINATION:
CONSTITUTIONAL/GENERAL APPEARANCE:  Well nourished, well developed, appears
stated age.
EYES:  Lids and conjunctivae noninjected.  No discharge.  No pallor.
ENT:  Lips within normal limit.  No cyanosis.  No pallor.
NECK:  Carotid arteries, bilateral normal upstroke.  No bruits.  No thrills.  No
jugular venous pressure or distention.
CERVICAL LYMPH NODES:  Nontender.  Nonenlarged.
THYROID:  Not enlarged.  No nodules.
CARDIOVASCULAR:  Precordial exam, nondisplaced.  No heaves or pericardial
thrills.  Rate and rhythm, regular.  Heart sounds, normal S1, normal S2.  No S3,
no gallop, no rub.  Systolic murmur, not heard.  Diastolic murmur, not heard.
RESPIRATORY:  Respiratory effort, unlabored.  Normal curvature.  No thoracic
deformity.  No chest wall tenderness.  Percussion, resonant.  Auscultation,
clear.  No wheezes, no rales, no rhonchi.
ABDOMEN:  Soft, nondistended, nontender.  No abdominal pain, no vomiting and
normal appetite.
MUSCULOSKELETAL:  No joint tenderness, normal gait, normal tone.
SKIN:  Warm and dry.
 
OVERALL IMPRESSION:  Normal EKG, normal troponin, normal cardiac catheterization
times 3 in the past.  At this time, no other cardiac workup or treatment is
necessary.
 
TRANSINT:[id number] Voice Confirmation ID: [id number] DOCUMENT ID: [id number]
 
 
 
CONSULT REPORT                                 [id number]    JOSE MARIA BLACK            
 
 
                                           
                                           RACHEL BENAVIDES MD             
 
 
 
Electronically Signed by RACHEL BENAVIDES on 19 at 1040
 
 
 
 
 
 
 
 
 
 
 
 
 
 
 
 
 
 
 
 
 
 
 
 
 
 
 
 
 
 
 
 
 
 
 
 
 
 
 
 
 
CC:                                                             [id number]
DICTATION DATE: 19 1302     :     19 2100      ADM IN  
                                                                              
[facility]                                          
[address] [id number]

## 2019-12-13 VITALS — DIASTOLIC BLOOD PRESSURE: 80 MMHG | SYSTOLIC BLOOD PRESSURE: 123 MMHG

## 2019-12-13 VITALS — SYSTOLIC BLOOD PRESSURE: 103 MMHG | DIASTOLIC BLOOD PRESSURE: 61 MMHG

## 2019-12-13 VITALS — DIASTOLIC BLOOD PRESSURE: 72 MMHG | SYSTOLIC BLOOD PRESSURE: 113 MMHG

## 2019-12-13 VITALS — DIASTOLIC BLOOD PRESSURE: 81 MMHG | SYSTOLIC BLOOD PRESSURE: 132 MMHG

## 2019-12-13 VITALS — SYSTOLIC BLOOD PRESSURE: 115 MMHG | DIASTOLIC BLOOD PRESSURE: 73 MMHG

## 2019-12-13 VITALS — SYSTOLIC BLOOD PRESSURE: 108 MMHG | DIASTOLIC BLOOD PRESSURE: 69 MMHG

## 2019-12-13 VITALS — DIASTOLIC BLOOD PRESSURE: 80 MMHG | SYSTOLIC BLOOD PRESSURE: 124 MMHG

## 2019-12-13 VITALS — SYSTOLIC BLOOD PRESSURE: 103 MMHG | DIASTOLIC BLOOD PRESSURE: 72 MMHG

## 2019-12-13 VITALS — SYSTOLIC BLOOD PRESSURE: 126 MMHG | DIASTOLIC BLOOD PRESSURE: 83 MMHG

## 2019-12-13 VITALS — SYSTOLIC BLOOD PRESSURE: 125 MMHG | DIASTOLIC BLOOD PRESSURE: 84 MMHG

## 2019-12-13 VITALS — SYSTOLIC BLOOD PRESSURE: 119 MMHG | DIASTOLIC BLOOD PRESSURE: 77 MMHG

## 2019-12-13 VITALS — DIASTOLIC BLOOD PRESSURE: 81 MMHG | SYSTOLIC BLOOD PRESSURE: 122 MMHG

## 2019-12-13 LAB
ANION GAP SERPL CALC-SCNC: 11 MMOL/L (ref 8–16)
APTT BLD: 37.4 SECONDS (ref 22.8–39.4)
BASOPHILS NFR BLD AUTO: 1 % (ref 0–2)
BUN SERPL-MCNC: 15 MG/DL (ref 7–18)
CALCIUM SERPL-MCNC: 8.9 MG/DL (ref 8.5–10.1)
CHLORIDE SERPL-SCNC: 108 MMOL/L (ref 98–107)
CO2 SERPL-SCNC: 25 MMOL/L (ref 21–32)
CREAT SERPL-MCNC: 1.1 MG/DL (ref 0.6–1.3)
EOSINOPHIL NFR BLD: 1.7 % (ref 0–7)
ERYTHROCYTE [DISTWIDTH] IN BLOOD BY AUTOMATED COUNT: 13.2 % (ref 11.5–14.5)
GLUCOSE SERPL-MCNC: 93 MG/DL (ref 74–106)
HCT VFR BLD CALC: 41.5 % (ref 42–54)
HGB BLD-MCNC: 13.4 G/DL (ref 13.5–17.5)
IMM GRANULOCYTES NFR BLD: 0.2 % (ref 0–5)
INR PPP: 1.36 (ref 0.85–1.17)
LYMPHOCYTES NFR BLD AUTO: 22.7 % (ref 15–50)
MAGNESIUM SERPL-MCNC: 2.1 MG/DL (ref 1.8–2.4)
MCH RBC QN AUTO: 30 PG (ref 26–34)
MCHC RBC AUTO-ENTMCNC: 32.3 G/DL (ref 31–37)
MCV RBC: 92.8 FL (ref 80–100)
MONOCYTES NFR BLD: 8.1 % (ref 2–11)
NEUTROPHILS NFR BLD AUTO: 66.3 % (ref 40–80)
OSMOLALITY SERPL CALC.SUM OF ELEC: 279 MOSM/KG (ref 275–300)
PHOSPHATE SERPL-MCNC: 3.6 MG/DL (ref 2.5–4.9)
PLATELET # BLD: 227 10X3/UL (ref 130–400)
PMV BLD AUTO: 9.8 FL (ref 7.4–10.4)
POTASSIUM SERPL-SCNC: 4 MMOL/L (ref 3.5–5.1)
PROTHROMBIN TIME: 16.2 SECONDS (ref 11.6–15)
RBC # BLD AUTO: 4.47 10X6/UL (ref 4.2–6.1)
SODIUM SERPL-SCNC: 140 MMOL/L (ref 136–145)
WBC # BLD AUTO: 4.8 10X3/UL (ref 4.8–10.8)

## 2019-12-13 NOTE — NUR
PT OOB TO BATHROOM, RN AT SIDE DURING AMBULATION, PT ABLE TO AMBULATE WITHOUT
ASSIST, CLEAR YELLOW VOID NOTED, PT AMBULATED BACK TO BED, REPOSITIONED FOR
COMFORT, NSR ON CM, OTHER VSS, NO WEAKNESS NOTED, PT EQUAL STRENGTH IN ALL
EXTREMITIES, DENIES PAIN OR NEEDS AT THIS TIME, WILL CONTINUE TO MONITOR

## 2019-12-13 NOTE — NUR
PT OOB TO BATHROOM WITH RN AT SIDE, NO ASSIST NEEDED IN AMBULATION, PT HAD
SEMIFORMED BROWN BM, PT REPOSITIONED BACK IN BED FOR COMFORT, VSS, NO FURTHER
NEEDS AT THIS TIME, CALL LIGHT IN REACH, BED ALARM ON, WILL CONTINUE TO
MONITOR

## 2019-12-13 NOTE — MORECARE
CASE MANAGEMENT DISCHARGE SUMMARY
 
 
PATIENT: JOSE MARIA HUSTON                    UNIT: [id number]
ACCOUNT#: [id number]                       ADM DATE: 19
AGE: 74     : 43  SEX: M            ROOM/BED: [facility]    
AUTHOR: MILENA,DOC                             PHYSICIAN:                               
 
REFERRING PHYSICIAN: MARGARET ELISE MD           
DATE OF SERVICE: 19
Discharge Plan
 
 
Patient Name: JOSE MARIA HUSTON
Facility: [facility]:[place]
Encounter #: [id number]
Medical Record #: [id number]
: 1945
Planned Disposition: Home
Anticipated Discharge Date: 
 
Discharge Date: 2019
Expected LOS: 
Initial Reviewer: [id number]
Initial Review Date: 2019
Generated: 19   3:48 pm 
Comments
 
DCP- Discharge Planning
 
Updated by [id number]: Reena Garcia on 19   4:54 pm CT
Patient Name: JOSE MARIA HUSTON                                     
Admission Status: ER   
Accout number: [id number]                              
Admission Date: 2019   
: 1945                                                        
Admission Diagnosis:   
Attending: BREANA ELISE                                                
Current LOS:  1   
  
Anticipated DC Date:    
Planned Disposition: Home   
Primary Insurance: MEDICARE A & B   
  
  
Discharge Planning Comments: CM met with patient at bedside after explaining 
CM role and obtaining verbal consent. Patient lives at home with his wife 
where he is independent with his care and plans to return there upon 
discharge.  Patient feels this would be a safe discharge.  CM discussed 
availability / needs of home health and medical equipment.  Patient denies 
 
any discharge needs at this time. Patient states he will have his family 
drive him home upon discharge.  CM will continue to follow and assist as 
needed with discharge planning / needs.  
  
  
  
  
  
  
: Reena Garcia
 DCPIA - Discharge Planning Initial Assessment
 
Updated by [id number]: Reena Garcia on 19   5:54 pm
*  Is the patient Alert and Oriented?
Yes
*  How many steps to enter\exit or inside your home? ramp *  PCP TORI GRISSOM * 
Pharmacy
[facility] [place]
*  Preadmission Environment
Home with Family
*  ADLs
Independent
*  Other Equipment
WALKER, CANE , W/C
*  List name and contact numbers for known caregivers / representatives who 
currently or will assist patient after discharge:
GEO HUSTON - DAUGHTER- [phone], [phone]
*  Verbal permission to speak to the caregivers and representatives has been 
obtained from the patient.
Yes
*  Community resources currently utilized
None
*  Additional services required to return to the preadmission environment?
No
*  Can the patient safely return to the preadmission environment?
Yes
*  Has this patient been hospitalized within the prior 30 days at any 
hospital?
No
 
 
 
 
 
 
 
Last DP export: 19   4:57 
Patient Name: JOSE MARIA HUSTON
 
Encounter #: [id number]
Page [id number]
 
 
 
 
 
Electronically Signed by YONNY ABBOTT on 19 at 1449
 
 
 
 
 
 
**All edits/amendments must be made on the electronic document**
 
DICTATION DATE: 19     : ANNABEL  19     
RPT#: [id number]                                ID DATE:19
                                               STATUS: DIS IN  
[facility]
 [place] [address]
***END OF REPORT***

## 2019-12-13 NOTE — NUR
DR ELISE AT BEDSIDE-SPOKE WITH PT REGARDING XERALTO-PROVIDED WITH DISCOUNT
BROCHURE-FAMILY MEMBER CALLED IMMEDIATELY-NOT QUALIFIED-L PERIPHERAL IV D/C
WITH TIP INTACT-
DISCHARGE INSTRUCTIONS GIVEN TO PT AND WIFE-BROTHER PRESENT-ASSISTED TO CAR
VIA WHEELCHAIR

## 2020-10-01 ENCOUNTER — HOSPITAL ENCOUNTER (OUTPATIENT)
Dept: HOSPITAL 84 - D.HCCARDIO | Age: 75
Discharge: HOME | End: 2020-10-01
Attending: INTERNAL MEDICINE
Payer: MEDICARE

## 2020-10-01 VITALS — BODY MASS INDEX: 23.3 KG/M2

## 2020-10-01 DIAGNOSIS — I20.9: Primary | ICD-10-CM
